# Patient Record
Sex: FEMALE | Race: WHITE
[De-identification: names, ages, dates, MRNs, and addresses within clinical notes are randomized per-mention and may not be internally consistent; named-entity substitution may affect disease eponyms.]

---

## 2017-03-06 NOTE — DEL
DATE OF DELIVERY:  2017

 

Admission And Delivery Note

 

DATE OF DELIVERY:  2017.

 

REASON FOR ADMISSION:  This  31-year-old  female,  2,

para 0-0-1-0 female with an EDC of 2017, was admitted in active labor.

The patient's pregnancy had been uncomplicated with no proteinuria, glucosuria,

or hypertension.  She had a positive group B strep screen however.  Also, she

refused pelvic exam and/or Pap smears because of vaginismus.  She had a previous

history of Donna-Parkinson-White arrhythmias, but had no difficulties during her

pregnancy.  She has had no previous syncope, has had no chest pain.  She did

become somewhat lightheaded once when eyedrops for dilatation replaced during on

exam, but no syncope at that time, and she has had no other difficulties.  She

said that about 0500 hours in the morning prior to admission, she was awake and

with her early contractions that were mild and infrequent, however as the day

went on, she has progressed in intensity and frequency and by late evening, she

was having contractions every 2-3 minutes.  She contacted her  who was in

town in the [facility] for guard duty and he came back.  They then presented here

for admission.  She said there has been no loss of fluid.  She has had no

vaginal bleeding.  The baby has been active.  She denies any headaches or

blurred vision and has had no chest pain or palpitations.

 

CURRENT MEDICATIONS:  None other than prenatal vitamins and she takes a calcium

supplement along with an iron tablet and vitamin D.

 

ALLERGIES:  None that are known.

 

SOCIAL HISTORY:  She does not smoke nor use alcohol.

 

PAST MEDICAL HISTORY:  Unremarkable except for previous tooth extraction surgery

and she has had previous lumbar disk herniation that did not require surgery.

She has had one previous miscarriage.

 

FAMILY HISTORY:  Unremarkable.

 

REVIEW OF SYSTEMS:  Full review of systems was discussed, other than that

mentioned above in the HPI was unremarkable.

 

PHYSICAL EXAMINATION:  VITAL SIGNS:  At this time showed her to be afebrile.

Blood pressure 118/80, pulse is 90 and regular, respirations were 18, and O2

saturation is 99% on room air.  GENERAL:  Well-developed, somewhat obese female,

who is quite anxious, but appeared in no acute distress.  HEENT:  Unremarkable

except for glasses.  NECK:  Supple.  There is no jugular venous distention.

CHEST:  Clear.  CARDIOVASCULAR:  Revealed a normal S1 and S2 without murmur,

rub, or gallop.  No significant tachycardia was noted.  ABDOMEN:  Obese, soft

with a gravid uterus measuring 40 cm.  Fetal heart tones were present in the

left lower quadrant and in 130 to 140 range.  She refused pelvic exam.  Multiple

nurses attempted with just well lubricated one finger and she would scream,

uncontrollably.  EXTREMITIES:  Showed just a trace edema about the ankles,

otherwise was unremarkable.  NEUROLOGIC:  She was completely intact.

 

Because we were unable to evaluate the pelvis, we spent some time talking to the

patient to get exam done at some kind.  Fetal monitor was placed and the baby

looks good.  Her contractions were every 3-4 minutes and she said they were

becoming very uncomfortable.  We were unable to evaluate cervix and had no idea

how far along she had progressed, because she had been in labor for that length

of time.  We were very concerned.  She then advised everyone that, she was told

that when she came into labor, then she could have an epidural and then a pelvic

exam could be done.  I did discuss with her the fact that if in fact, the

patient is not in good labor that the epidural could slow this down or stop it,

but she refused to be evaluated in any other way.  After risks and benefits of

epidural anesthesia were discussed with her in detail, she requested we proceed.

Anesthesia was called, epidural was placed, and after appropriate pain relief

was obtained, I was able to do a pelvic exam using well lubricated one finger.

The cervix was high, approximately 70% effaced with just a dimple for

dilatation, but was quite soft, still quite posterior.  However, the patient did

continue to have contractions every 3-4 minutes and although, she was no longer

feeling and they appeared to be a fairly good quality.  Therefore, we elected to

admit her and continued to observe.  The epidural was continued and throughout

the remainder of the night and the following morning, she continued her

contractions, she remained quite comfortable.  Initially, she did develop a

little bit of hypotension, but we aggressively hydrated her with IV fluids and

that seemed to improve.  By morning, however hypotension continued with some

fetal heart rate variation and I elected to decrease her epidural infusion rate.

We continued aggressive hydration and with that, her blood pressure improved.

Fetal heart tones improved nicely and by midmorning, she had progressed to 2 cm

dilatation.  We allowed her to continue to labor with membranes intact and by

the late afternoon, she progressed to 5 cm dilatation.  The head was down to

approximately -1 station and her contractions were still about every 3-5 minutes

apart.  She was quite very comfortable.  We elected to go ahead and rupture

membranes as the head appeared to be floating a bit and a small rent in the

amniotic sac was placed with my fingers in place without fluid to be released

and the head came right down on to the cervix with no evidence of nuchal cord.

We continued to monitor then throughout the evening and by about 2200 hours, she

went on to complete her dilatation.  We did have to increase her epidural

infusion rate because of increasing discomfort and she again refused to allow

pelvic exam.  However, once the rate was increased, she was more comfortable and

this was accomplished.  She then began pushing and when I arrived at

approximately 2230 hours, she was at +2 station, vertex LAZARUS presentation.  She

continued to push and then began having some vaginal bleeding.  This was

controlled most of the time which was pressure.  We allowed her to continue to

push and for the next 2 hours, she eventually was able to get the baby down to

about a +4 station, but was unable to get the head under the symphysis pubis.  I

felt she had plenty of room in the pelvis, but her pushes were very short and

proved to be fairly adequate at best.  After over 2 hours of pushing, I

discussed vacuum extraction with both the patient and her  and they

agreed that they would like to proceed as she was tiring out.  We had also seen

some significant deceleration during pushes that did respond with oxygen

therapy, but they were becoming more and more frequent.  Vacuum extractor was

placed and with her next push, suction was applied and we were able to advance

the head quite easily to just under the symphysis pubis.  Suction was released

and we waited for her next contraction and with the next contraction and her

pushing along with the help of the vacuum extractor, we were able to advance the

head under the symphysis pubis and get it to .  Prior to that, numbed

the midline of the perineal body with 1% Xylocaine, but before I was able to

make episiotomy, she pushed aggressively and the head was delivered.  The

nasopharynx was then suctioned free of amniotic fluid and blood.  The baby

rotated to GILSON presentation.  There was a nuchal cord x2.  It was not

excessively tight, but I was unable to stretch it over the head.  The clamps

were placed and the cord was cut and the cord was unwrapped.  The baby was then

rotated to an LOT presentation and the right anterior followed by the left

posterior shoulder were delivered without difficulty.  The nasopharynx was again

suctioned free of amniotic fluid and blood.  Cord clamp was placed much more

proximal to the umbilicus and the baby was handed off to the nurse in

attendance.  Apgar score was 9 and 9.  Birth weight was 7 pounds 2 ounces.  It

was a female infant.  Attention was returned to the mother and cord blood was

obtained and handed off.  The placenta was delivered after 3-4 minutes of

uterine massage.  It appeared to be in a Adkins presentation and intact.  A 10

units of Pitocin was placed in the full IV bag and it run at 999 mL/h.  With the

uterine massage, hemostasis was obtained.  The patient had however persistent

significant vaginal bleeding.  She was very touchy when we came to vaginal exam

despite the fact that we had the epidural still running.  Visualization was

difficult and she spent a fair amount of time moving.  I re-anesthetized the

perineal body and inspected the vaginal area.  The patient had 2 vaginal tears.

One it was somewhat superficial along the left vaginal gutter.  It was

approximately 4 cm in length, but was not actively bleeding.  Another deeper

laceration extended from the perineal body proximally up to about 6-7 cm in the

midline of the vagina that had torn through the mucosa.  With the help of the

nurse, a Gelpi retractors and some good visualization, I was able to identify

the apex of the tear and a 2-0 chromic suture was placed.  Then, using a running

2-0 chromic, I was able to approximate the tear to the level of the perineal

body.  This controlled bleeding very well.  The perineal body was then rebuilt

using interrupted chromic sutures.  The anterior capsule of the sphincter muscle

had been torn somewhat and this was repaired with a single 2-0 chromic suture.

The vaginal mucosa was then closed with a running 2-0 chromic suture as was the

squamous mucosa over the perineal body.  The patient actually tolerated the

whole procedure fairly well once we were able to get started.  Estimated blood

loss, however, was significant at approximately 300 to 400 mL as the tear had

extended up to the vaginal vault and it was a partial 3rd degree.  The vagina

was then reinspected and all packing was removed.  The rectum was checked and

remained patent and her bleeding had decreased to very minimal thereafter.  All

instruments were accounted for.  The patient remained in the delivery room

awake, alert, and with her baby with stable vital signs.  The epidural was

discontinued and her IV was slowed and will be changed to saline lock when

stable.  We will proceed from there.

 

Job#: [id number]

DD: 2017 1255

DT: 2017 1531 /BONIFACIOL

## 2017-03-07 NOTE — PCM.PNPP
- General Info


Date of Service: 17


Functional Status: Reports: pain controlled





- Review of Systems


General: Reports: no symptoms


HEENT: Reports: no symptoms


Pulmonary: Reports: no symptoms


Cardiovascular: Reports: no symptoms


Gastrointestinal: Reports: No symptoms


Genitourinary: Reports: no symptoms


Musculoskeletal: Reports: no symptoms


Skin: Reports: no symptoms


Neurological: Reports: no symptoms


Psychiatric: Reports: no symptoms





- General Info


Date of Service: 17





- Patient Data


Vital Signs - most recent: 


 Last Vital Signs











Temp  98 F   17 15:45


 


Pulse  83   17 15:45


 


Resp  18   17 15:45


 


BP  116/77   17 15:45


 


Pulse Ox  98   17 15:45











Weight - most recent: 92.533 kg


Lab Results - last 24 hrs: 


 Laboratory Results - last 24 hr











  17 Range/Units





  06:35 


 


WBC  15.5 H  (4.5-12.0)  X10-3/uL


 


RBC  2.95 L  (3.23-5.20)  x10(6)uL


 


Hgb  8.6 L  (11.5-15.5)  g/dL


 


Hct  26.4 L  (30.0-51.3)  %


 


MCV  89.5  (80-96)  fL


 


MCH  29.2  (27.7-33.6)  pg


 


MCHC  32.7  (32.2-35.4)  g/dL


 


RDW  13.4  (11.5-15.5)  %


 


Plt Count  172  (125-369)  X10(3)uL


 


MPV  7.4  (7.4-10.4)  fL


 


Neut % (Auto)  69.0  (46-82)  %


 


Lymph % (Auto)  21.7  (13-37)  %


 


Mono % (Auto)  7.9  (4-12)  %


 


Eos % (Auto)  1  (1.0-5.0)  %


 


Baso % (Auto)  0  (0-2)  %


 


Neut #  10.7 H  (1.6-8.3)  #


 


Lymph #  3.4  (0.6-5.0)  #


 


Mono #  1.2  (0.0-1.3)  #


 


Eos #  0.2  (0.0-0.8)  #


 


Baso #  0.0  (0.0-0.2)  #











Med Orders - Current: 


 Current Medications





Acetaminophen/Hydrocodone Bitart (Norco 325-5 Mg)  1 - 2 tab PO Q4H PRN


   PRN Reason: Pain (severe 7-10)


   Last Admin: 17 17:11 Dose:  1 tab


Ibuprofen (Motrin)  800 mg PO Q8H PRN


   PRN Reason: Pain


   Last Admin: 17 15:00 Dose:  800 mg


Prenatal Multivit/Folic Acid/Iron (Prenatal-U)  1 each PO DAILY@1200 LATOSHA


   Last Admin: 17 11:54 Dose:  1 each





Discontinued Medications





Ampicillin Sodium (Ampicillin) Confirm Administered Dose 2 gm IV .STK-MED ONE


   Stop: 17 00:30


   Last Admin: 17 00:48 Dose:  2 gm


Ampicillin Sodium (Ampicillin) Confirm Administered Dose 1 gm IV .STK-MED ONE


   Stop: 17 03:37


   Last Admin: 17 03:58 Dose:  Not Given


Ampicillin Sodium (Ampicillin) Confirm Administered Dose 1 gm IV .STK-MED ONE


   Stop: 17 07:54


   Last Admin: 17 07:56 Dose:  Not Given


Diphenhydramine HCl (Benadryl)  25 mg IVPUSH ASDIRECTED PRN


   PRN Reason: PRURITUS


Ephedrine Sulfate (Ephedrine Sulfate)  5 mg IVPUSH ASDIRECTED PRN


   PRN Reason: HYPOTENSION


   Last Admin: 17 06:33 Dose:  5 mg


Ephedrine Sulfate (Ephedrine Sulfate) Confirm Administered Dose 50 mg .ROUTE 

.STK-MED ONE


   Stop: 17 05:57


   Last Admin: 17 07:56 Dose:  Not Given


Hydroxyzine HCl (Vistaril)  25 - 50 mg IM Q4H PRN


   PRN Reason: N/V


Ampicillin Sodium 2 gm/ Sodium (Chloride)  100 mls @ 200 mls/hr IV ONETIME ONE


   Stop: 17 00:29


   Last Admin: 17 00:40 Dose:  200 mls/hr


Ampicillin Sodium 1 gm/ Sodium (Chloride)  50 mls @ 100 mls/hr IV Q4H LATOSHA


   Last Admin: 17 23:50 Dose:  100 mls/hr


Lactated Ringer's (Ringers, Lactated)  1,000 mls @ 150 mls/hr IV ASDIRECTED LATOSHA


   Last Admin: 17 05:21 Dose:  150 mls/hr


Naloxone HCl 0.4 mg/ Sodium (Chloride)  101 mls @ 25 mls/hr IV ASDIRECTED PRN


   PRN Reason: PER ORDER OF ANESTHESIA


Lidocaine HCl (Xylocaine 1%)  20 ml INJECT ONETIME ONE


   Stop: 17 00:06


   Last Admin: 17 00:15 Dose:  20 ml


Lidocaine HCl (Xylocaine 1%)  20 ml INJECT ONETIME ONE


   Stop: 17 00:41


Naloxone HCl (Narcan)  0.1 mg IVPUSH ASDIRECTED PRN


   PRN Reason: RESPIRATORY STATUS


Ondansetron HCl (Zofran)  4 mg IVPUSH Q6H PRN


   PRN Reason: N/V


Oxytocin (Pitocin)  10 unit IV ONETIME ONE


   Stop: 17 00:33


   Last Admin: 17 00:32 Dose:  10 unit


Promethazine HCl (Phenergan)  6.25 - 12.5 mg IV Q4H PRN


   PRN Reason: NAUSEA AND VOMITING


Sodium Chloride (Saline Flush)  10 ml FLUSH ASDIRECTED PRN


   PRN Reason: Keep Vein Open


   Last Admin: 17 06:00 Dose:  10 ml











- Infant Interaction


Infant Disposition, Postpartum: [place] in Room with Family


Support Person: 





- Postpartum Recovery Exam


Fundal Tone: Firm


Fundal Level: At Umbilicus


Fundal Placement: Midline


Lochia Amount: Small, Moderate


Lochia Color: Rubra/Red


Perineum Description: Edematous


Episiotomy/Laceration: Approximated


Bladder Status: Voiding


Urinary Elimination: Voided





- Exam


General: alert, oriented


HEENT: Pupils equal


Neck: supple


Lungs: Clear to auscultation, Normal respiratory effort


Cardiovascular: regular rate, regular rhythm


Abdomen: bowel sounds present, soft, no tenderness, no distension


Extremities: no edema


Skin: warm, dry, intact


Wound/Incisions: healing well


Neurological: no new focal deficit


Psy/Mental Status: alert, normal affect, normal mood





- Problem List & Annotations


(1) Delivery normal


SNOMED Code(s): 18559329


   Code(s): O80 - ENCOUNTER FOR FULL-TERM UNCOMPLICATED DELIVERY; Z37.9 - 

OUTCOME OF DELIVERY, UNSPECIFIED   Status: Acute   Current Visit: Yes   





(2) WPW (Donna-Parkinson-White syndrome)


SNOMED Code(s): 04123422


   Code(s): I45.6 - PRE-EXCITATION SYNDROME   Status: Acute   Current Visit: 

Yes   





- Problem List Review


Problem List Initiated/Reviewed/Updated: Yes





- Plan


Plan:: 





Routine cares

## 2017-03-08 NOTE — PN
DATE SEEN:  03/08/2017

 

CHIEF COMPLAINT:  Mild abdominal discomfort.

 

HISTORY OF PRESENT ILLNESS:  This is a 31-year-old female, who delivered 2 days

ago, is second day postpartum.  She has some mild discomfort and cramps,

controlled by Motrin.  She denies any fever, chills, or chest pain.  No

headache.  No shortness of breath.

 

PAST MEDICAL HISTORY:  Donna-Parkinson-White syndrome.

 

MEDICATIONS:  Reviewed.

 

ALLERGIES:  Reviewed.

 

PHYSICAL EXAMINATION:  GENERAL:  Pleasant.  VITAL SIGNS:  Blood pressure is

normal.  Temperature 97.8.  ABDOMEN:  Soft and benign.  MENTAL STATUS:  Alert.

NEUROLOGIC:  No focal findings.

 

LABORATORY:  Yesterday's hemoglobin is 8.6.

 

FINAL IMPRESSION:

1. Second day postpartum, term vaginal delivery.

2. Donna-Parkinson-White syndrome, stable.

3. Anemia due to blood loss.

 

PLAN:  We will discharge home today.  Continue iron tablets, prenatal vitamins,

and return at 6 weeks.

 

Job#: [id number]

DD: 03/08/2017 0933

DT: 03/08/2017 1025 TN/THOMAS

## 2017-03-09 NOTE — DISCH
DISCHARGE DATE:  03/08/2017

 

REASON FOR ADMISSION:  Term pregnancy in labor.

 

DISCHARGE DIAGNOSES:

1. Spontaneous vaginal delivery, vacuum assisted.

2. Anemia.

3. Donna-Parkinson-White.

 

BRIEF HISTORY AND HOSPITAL COURSE:  This is a 31-year-old female, admitted for a

regular contractions, delivered by Dr. Mercer using a vacuum assisted delivery.

Hemoglobin was down to 8.6.  The patient was asymptomatic, has also had Donna-

Parkinson-White before, but no symptoms.  Discharged home today on the 8th, on

iron tablets and prenatal vitamins.

 

FOLLOW-UP:  She will follow up in the office in 6 weeks.

 

Please note, that I spent 35 minutes in discharge of the patient.

 

Job#: [id number]

DD: 03/08/2017 0934

DT: 03/09/2017 0413 TN/THOMAS

## 2020-06-11 ENCOUNTER — HOSPITAL ENCOUNTER (EMERGENCY)
Dept: HOSPITAL 7 - FB.ED | Age: 35
Discharge: HOME | End: 2020-06-11
Payer: COMMERCIAL

## 2020-06-11 VITALS — HEART RATE: 87 BPM | DIASTOLIC BLOOD PRESSURE: 70 MMHG | SYSTOLIC BLOOD PRESSURE: 121 MMHG

## 2020-06-11 DIAGNOSIS — R07.89: ICD-10-CM

## 2020-06-11 DIAGNOSIS — E66.9: ICD-10-CM

## 2020-06-11 DIAGNOSIS — R53.83: ICD-10-CM

## 2020-06-11 DIAGNOSIS — I45.6: Primary | ICD-10-CM

## 2020-06-11 DIAGNOSIS — Z79.899: ICD-10-CM

## 2020-06-11 NOTE — EDM.PDOC
ED HPI GENERAL MEDICAL PROBLEM





- General


Stated Complaint: CHEST ACHE


Time Seen by Provider: 20 16:05


Source of Information: Reports: Patient


History Limitations: Reports: No Limitations





- History of Present Illness


INITIAL COMMENTS - FREE TEXT/NARRATIVE: 





34-year-old female who reports development of nasal congestion and fatigue and 

the feeling that she had a viral type illness in the beginning of May 2020. She 

reports that on May 6 she began to have pains in her central chest that a 

pressure and aching type pain that seemed to come on with activity and get 

better with rest. The pain seemed to come and go. She felt somewhat short of 

breath associated with them. She has continued to have these intermittent chest 

pains throughout and on 2020 she had a COVID 19 tests performed at the 

[facility] in [place] and it was negative. The symptoms continued and on 6/

3/2020 because of the symptoms and the symptoms of fatigue and malaise that 

were not going away, she was seen again at [facility] by Dr. Alejo and 

had an EKG which showed nothing acute, a chest x-ray which was normal and lab 

tests which were normal including a negative d-dimer. She reports the beginning 

2 days ago, the pain in her chest which is an aching pain seemed to become 

constant and has been present continuously for the past 2 days and she would 

rate the pain as a 3/10. It is different in that it is not exacerbated by 

exercise. It does seem to be somewhat worse when she lies down. Another 

predominant symptom is that she just feels very fatigued and has no exercise 

tolerance at all. She denies any shortness of breath. She has had no cough. 

There has been no nausea or vomiting. There has been no fevers or chills. She 

has no nasal congestion or sore throat. She has been eating and drinking 

normally. No feeling of racing heart rate or slow heart rate. No syncope or 

presyncope. There are no other associated signs or symptoms. There are no other 

modifying factors.


Onset: Other (Ongoing since May 6, 2020 but for the past 2 days has been 

constant and seems to be worse/different.)


Duration: Constant, Getting Worse


Location: Reports: Chest


Quality: Reports: Ache, Pressure


Severity: Mild (3/10.)


Improves with: Reports: None


Worsens with: Reports: Other (Lying down)


Context: Reports: Other (As above)


Associated Symptoms: Reports: Chest Pain, Malaise, Other (Fatigue)


Treatments PTA: Reports: Other (see below) (Nothing)


  ** Midsternal chest


Pain Score (Numeric/FACES): 3





- Related Data


 Allergies











Allergy/AdvReac Type Severity Reaction Status Date / Time


 


No Known Allergies Allergy   Verified 20 15:57











Home Meds: 


 Home Meds





Multivit 45/Iron/Folate 6/Dha [Prenate Dha Softgel] 1 cap PO DAILY 01/15/17 [

History]


Cholecalciferol (Vitamin D3) [Vitamin D3] 1,000 unit PO BID 17 [History]


Ferrous Sulfate [Iron] 325 mg PO Q3D 17 [History]


Ca/D3/Mag Ox/Zinc//Delfino/Bor [Calcium 600+D3 Plus Caplet] 1 each PO BID  [History]











Past Medical History


HEENT History: Reports: Impaired Vision


Other HEENT History: pt wears glasses


Cardiovascular History: Reports: Arrhythmia (donna parkinson white syndrome)


Other OB/GYN History:  AB1


Musculoskeletal History: Reports: Back Pain, Chronic, Other (See Below)


Other Musculoskeletal History: hx of herniated disc


Psychiatric History: Reports: Anxiety, Depression


Endocrine/Metabolic History: Reports: Obesity/BMI 30+


Dermatologic History: Reports: Other (See Below)


Other Dermatologic History: very dry/cracked in winter





- Infectious Disease History


Infectious Disease History: Reports: Chicken Pox





- Past Surgical History


Other Surgical History Comment: No previous surgeries.





Social & Family History





- Tobacco Use


Smoking Status *Q: Unknown Ever Smoked (Nonsmoker)


Second Hand Smoke Exposure: No





- Caffeine Use


Caffeine Use: Reports: Coffee, Soda, Tea


Caffeine Use Comment: discussed importance to limit caffeine intake





- Alcohol Use


Alcohol Use History: Yes


Alcohol Use Frequency: Monthly (1-2 times a month.)





- Living Situation & Occupation


Living situation: Reports: 


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





ED ROS GENERAL





- Review of Systems


Review Of Systems: See Below


Constitutional: Reports: Malaise, Fatigue.  Denies: Fever, Chills, Diaphoresis, 

Decreased Appetite


HEENT: Reports: No Symptoms


Respiratory: Reports: No Symptoms


Cardiovascular: Reports: Chest Pain.  Denies: Edema, Lightheadedness, 

Palpitations


Endocrine: Reports: No Symptoms


GI/Abdominal: Reports: No Symptoms


: Reports: No Symptoms


Musculoskeletal: Reports: No Symptoms


Skin: Reports: No Symptoms


Neurological: Reports: No Symptoms


Psychiatric: Reports: No Symptoms


Hematologic/Lymphatic: Reports: No Symptoms


Immunologic: Reports: No Symptoms





ED EXAM, GENERAL





- Physical Exam


Exam: See Below


Exam Limited By: No Limitations


General Appearance: Alert, WD/WN, No Apparent Distress


Eye Exam: Bilateral Eye: EOMI, Normal Inspection, PERRL


Ears: Normal External Exam, Hearing Grossly Normal


Ear Exam: Bilateral Ear: Auricle Normal


Nose: Normal Inspection, Normal Mucosa, No Blood


Throat/Mouth: Normal Inspection, Normal Lips, Normal Oropharynx, Normal Voice, 

No Airway Compromise


Head: Atraumatic, Normocephalic


Neck: Normal Inspection, Supple, Non-Tender, Full Range of Motion


Respiratory/Chest: No Respiratory Distress, Lungs Clear, Normal Breath Sounds, 

No Accessory Muscle Use, Chest Non-Tender


Cardiovascular: Normal Peripheral Pulses, Regular Rate, Rhythm, No Edema, No JVD

, No Murmur


Peripheral Pulses: 2+: Radial (L), Radial (R)


GI/Abdominal: Normal Bowel Sounds, Soft, Non-Tender


Back Exam: Normal Inspection, Full Range of Motion


Extremities: Normal Inspection, Normal Range of Motion, Non-Tender, No Pedal 

Edema, Normal Capillary Refill


Neurological: Alert, Oriented, CN II-XII Intact, Normal Cognition, No Motor/

Sensory Deficits


Psychiatric: Normal Affect


Skin Exam: Warm, Dry, Intact, Normal Color, No Rash





EKG INTERPRETATION


EKG Date: 20


Time: 16:10


Rhythm: NSR


Rate (Beats/Min): 92


Axis: Normal


P-Wave: Present


QRS: Normal


ST-T: Other (Inverted T waves in V1 through V4.)


QT: Prolonged (Slightly prolonged QTc.)


Comparison: No Change (No change in an EKG that was performed on 10/25/2016. A 

copy of this EKG was sent to us from the [facility] in [place] and will be 

placed in the patient's record.)





Course





- Vital Signs


Last Recorded V/S: 


 Last Vital Signs











Temp  36.4 C   20 15:52


 


Pulse  87   20 17:33


 


Resp  18   20 17:33


 


BP  121/70   20 17:33


 


Pulse Ox  100   20 17:33














- Orders/Labs/Meds


Orders: 


 Active Orders 24 hr











 Category Date Time Status


 


 EKG Documentation Completion [RC] ASDIRECTED Care  20 16:49 Active


 


 Chest 2V [CR] Stat Exams  20 16:47 Taken


 


 EKG 12 Lead [EK] Routine Ther  20 16:49 Ordered











Labs: 


 Laboratory Tests











  20 Range/Units





  16:45 16:45 16:45 


 


WBC  6.3    (4.5-12.0)  X10-3/uL


 


RBC  4.52    (3.23-5.20)  x10(6)uL


 


Hgb  12.6    (11.5-15.5)  g/dL


 


Hct  39.6  D    (30.0-51.3)  %


 


MCV  87.6    (80-96)  fL


 


MCH  27.9    (27.7-33.6)  pg


 


MCHC  31.8 L    (32.2-35.4)  g/dL


 


RDW  12.6    (11.5-15.5)  %


 


Plt Count  287    (125-369)  X10(3)uL


 


MPV  6.9 L    (7.4-10.4)  fL


 


Neut % (Auto)  60.6    (46-82)  %


 


Lymph % (Auto)  29.7    (13-37)  %


 


Mono % (Auto)  8.0    (4-12)  %


 


Eos % (Auto)  1    (1.0-5.0)  %


 


Baso % (Auto)  1    (0-2)  %


 


Neut # (Auto)  3.8    (1.6-8.3)  #


 


Lymph # (Auto)  1.9    (0.6-5.0)  #


 


Mono # (Auto)  0.5    (0.0-1.3)  #


 


Eos # (Auto)  0.1    (0.0-0.8)  #


 


Baso # (Auto)  0.0    (0.0-0.2)  #


 


PT   11.3 H   (9.0-11.1)  sec


 


INR   1.05   (1.00-1.24)  


 


APTT   28.3   (24.4-33.2)  SECONDS


 


Sodium    140  (135-145)  mmol/L


 


Potassium    3.6  (3.5-5.3)  mmol/L


 


Chloride    103  (100-110)  mmol/L


 


Carbon Dioxide    28  (21-32)  mmol/L


 


BUN    7  (7-18)  mg/dL


 


Creatinine    0.6  (0.55-1.02)  mg/dL


 


Est Cr Clr Drug Dosing    114.08  mL/min


 


Estimated GFR (MDRD)    > 60  (>60)  


 


BUN/Creatinine Ratio    11.7  (9-20)  


 


Glucose    93  ()  mg/dL


 


Calcium    8.8  (8.6-10.2)  mg/dL


 


Magnesium     (1.8-2.5)  mg/dL


 


Total Bilirubin    0.3  (0.1-1.3)  mg/dL


 


AST    9  (5-25)  IU/L


 


ALT    15  (12-36)  U/L


 


Alkaline Phosphatase    61  ()  IU/L


 


Troponin I     (4.0-60.3)  pg/mL


 


NT-Pro-B Natriuret Pep     (<=125)  pg/mL


 


Total Protein    7.7  (6.0-8.0)  g/dL


 


Albumin    4.2  (3.5-5.2)  g/dL


 


Globulin    3.5  g/dL


 


Albumin/Globulin Ratio    1.2  


 


Urine HCG, Qual     (NEGATIVE)  














  20 Range/Units





  16:45 16:45 17:30 


 


WBC     (4.5-12.0)  X10-3/uL


 


RBC     (3.23-5.20)  x10(6)uL


 


Hgb     (11.5-15.5)  g/dL


 


Hct     (30.0-51.3)  %


 


MCV     (80-96)  fL


 


MCH     (27.7-33.6)  pg


 


MCHC     (32.2-35.4)  g/dL


 


RDW     (11.5-15.5)  %


 


Plt Count     (125-369)  X10(3)uL


 


MPV     (7.4-10.4)  fL


 


Neut % (Auto)     (46-82)  %


 


Lymph % (Auto)     (13-37)  %


 


Mono % (Auto)     (4-12)  %


 


Eos % (Auto)     (1.0-5.0)  %


 


Baso % (Auto)     (0-2)  %


 


Neut # (Auto)     (1.6-8.3)  #


 


Lymph # (Auto)     (0.6-5.0)  #


 


Mono # (Auto)     (0.0-1.3)  #


 


Eos # (Auto)     (0.0-0.8)  #


 


Baso # (Auto)     (0.0-0.2)  #


 


PT     (9.0-11.1)  sec


 


INR     (1.00-1.24)  


 


APTT     (24.4-33.2)  SECONDS


 


Sodium     (135-145)  mmol/L


 


Potassium     (3.5-5.3)  mmol/L


 


Chloride     (100-110)  mmol/L


 


Carbon Dioxide     (21-32)  mmol/L


 


BUN     (7-18)  mg/dL


 


Creatinine     (0.55-1.02)  mg/dL


 


Est Cr Clr Drug Dosing     mL/min


 


Estimated GFR (MDRD)     (>60)  


 


BUN/Creatinine Ratio     (9-20)  


 


Glucose     ()  mg/dL


 


Calcium     (8.6-10.2)  mg/dL


 


Magnesium   1.8   (1.8-2.5)  mg/dL


 


Total Bilirubin     (0.1-1.3)  mg/dL


 


AST     (5-25)  IU/L


 


ALT     (12-36)  U/L


 


Alkaline Phosphatase     ()  IU/L


 


Troponin I  5.4    (4.0-60.3)  pg/mL


 


NT-Pro-B Natriuret Pep  61    (<=125)  pg/mL


 


Total Protein     (6.0-8.0)  g/dL


 


Albumin     (3.5-5.2)  g/dL


 


Globulin     g/dL


 


Albumin/Globulin Ratio     


 


Urine HCG, Qual    Negative  (NEGATIVE)  














- Radiology Interpretation


Free Text/Narrative:: 





Chest x-ray PA and lateral shows no acute disease.





- Re-Assessments/Exams


Free Text/Narrative Re-Assessment/Exam: 





20 17:50: Patient's blood tests are all reassuringly normal. Her chest x-

ray showed no acute pathology. Her EKG was unchanged from an EKG performed in 

2016. A Holter monitor patient had come back and it showed no significant 

problem. I am unsure why she is having the fatigue and the chest discomfort. 

She will need to follow-up with her primary provider as she may need additional 

outpatient testing or even referral to cardiology for a recheck. I have advised 

her to avoid any strenuous activity until she can get the to do so through her 

primary provider. Precautions and reasons for return to the emergency 

department were discussed with the patient while she was in the emergency 

department and were detailed in her discharge instructions.








Departure





- Departure


Time of Disposition: 18:58


Disposition: Home, Self-Care 01


Condition: Good (Stable)


Clinical Impression: 


 Atypical chest pain, WPW (Donna-Parkinson-White syndrome)





Fatigue


Qualifiers:


 Fatigue type: unspecified Qualified Code(s): R53.83 - Other fatigue








- Discharge Information


Instructions:  Fatigue, Nonspecific Chest Pain, Adult, Easy-to-Read


Referrals: 


Gato Alejo MD [Primary Care Provider] - 


Additional Instructions: 


Your blood tests were all reassuringly normal. Your EKG was unchanged from an 

EKG that was performed and 2016. Your chest x-ray showed no acute 

problem. Your pregnancy test was negative. I do not have an etiology for your 

chest discomfort and your fatigue. Does not appear to be anything of a serious 

nature at this point. You should, however, avoid any strenuous activity until 

you are cleared by your primary provider. You should plan on following up with 

your primary provider as you may need additional outpatient testing and even 

possibly referral to the cardiologist again for recheck. Back to the emergency 

department for marked increase in pain, difficulty breathing at rest, coughing 

of blood, swelling in your legs or any other concerning sign or symptom.





Sepsis Event Note (ED)





- Focused Exam


Vital Signs: 


 Vital Signs











  Temp Pulse Resp BP BP Pulse Ox


 


 20 17:33   87  18  121/70  119/82  100


 


 20 16:57   83  18   108/57 L  100


 


 20 15:52  36.4 C  91  18  137/109 H   100














- My Orders


Last 24 Hours: 


My Active Orders





20 16:47


Chest 2V [CR] Stat 





20 16:49


EKG Documentation Completion [RC] ASDIRECTED 


EKG 12 Lead [EK] Routine 














- Assessment/Plan


Last 24 Hours: 


My Active Orders





20 16:47


Chest 2V [CR] Stat 





20 16:49


EKG Documentation Completion [RC] ASDIRECTED 


EKG 12 Lead [EK] Routine

## 2020-06-11 NOTE — CR
INDICATION:  Central and slightly left chest pain. 



CHEST, TWO VIEWS:  PA and lateral views of the chest reveal the heart and 
mediastinum and bony thorax to be unremarkable.  



An active infiltrate or effusion was not identified. 



IMPRESSION:  No active disease.  
MTDD

## 2020-09-23 ENCOUNTER — HOSPITAL ENCOUNTER (EMERGENCY)
Dept: HOSPITAL 7 - FB.ED | Age: 35
Discharge: HOME | End: 2020-09-23
Payer: COMMERCIAL

## 2020-09-23 VITALS — SYSTOLIC BLOOD PRESSURE: 118 MMHG | HEART RATE: 85 BPM | DIASTOLIC BLOOD PRESSURE: 76 MMHG

## 2020-09-23 DIAGNOSIS — R07.9: Primary | ICD-10-CM

## 2020-09-23 DIAGNOSIS — E66.9: ICD-10-CM

## 2020-09-23 NOTE — EDM.PDOC
ED HPI GENERAL MEDICAL PROBLEM





- General


Chief Complaint: Syncope


Stated Complaint: FELT LIKE WAS GOING TO PASS OUT


Time Seen by Provider: 20 11:00


Source of Information: Reports: Patient


History Limitations: Reports: No Limitations





- History of Present Illness


INITIAL COMMENTS - FREE TEXT/NARRATIVE: 





Patient presented to the ED because of seeing stars while driving. She just went

to have a stress test from the [facility] due tpo chest pain. She was chest 

pain free upon her arrival in the ED,denies any N/V, dyspnea.  


  ** chest


Pain Score (Numeric/FACES): 3





- Related Data


                                    Allergies











Allergy/AdvReac Type Severity Reaction Status Date / Time


 


No Known Allergies Allergy   Verified 20 15:57











Home Meds: 


                                    Home Meds





Multivit 45/Iron/Folate 6/Dha [Prenate Dha Softgel] 1 cap PO DAILY 01/15/17 

[History]


Cholecalciferol (Vitamin D3) [Vitamin D3] 1,000 unit PO BID 17 [History]


Ferrous Sulfate [Iron] 325 mg PO Q3D 17 [History]


Ca/D3/Mag Ox/Zinc//Delfino/Bor [Calcium 600+D3 Plus Caplet] 1 each PO BID 

20 [History]











Past Medical History


HEENT History: Reports: Impaired Vision


Other HEENT History: pt wears glasses


Cardiovascular History: Reports: Arrhythmia, Other (See Below)


Other Cardiovascular History: Hernandez-Parkinson white Syndrome


OB/GYN History: Reports: Pregnancy


Other OB/GYN History:  AB1


Musculoskeletal History: Reports: Back Pain, Chronic, Other (See Below)


Other Musculoskeletal History: hx of herniated disc


Psychiatric History: Reports: Anxiety, Depression


Other Psychiatric History: seasonal affective disorder


Endocrine/Metabolic History: Reports: Obesity/BMI 30+


Dermatologic History: Reports: Other (See Below)


Other Dermatologic History: very dry/cracked in winter





- Infectious Disease History


Infectious Disease History: Reports: Chicken Pox





- Past Surgical History


Other Surgical History Comment: No previous surgeries.





Social & Family History





- Family History


Family Medical History: Unobtainable





- Tobacco Use


Smoking Status *Q: Never Smoker





- Caffeine Use


Caffeine Use: Reports: Coffee


Caffeine Use Comment: discussed importance to limit caffeine intake





- Recreational Drug Use


Recreational Drug Use: No





- Living Situation & Occupation


Living situation: Reports: 


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





ED ROS GENERAL





- Review of Systems


Review Of Systems: See Below


Constitutional: Reports: No Symptoms


HEENT: Reports: No Symptoms


Respiratory: Reports: No Symptoms


Cardiovascular: Reports: No Symptoms


Endocrine: Reports: No Symptoms


GI/Abdominal: Reports: No Symptoms


: Reports: No Symptoms


Musculoskeletal: Reports: No Symptoms


Neurological: Reports: No Symptoms


Psychiatric: Reports: No Symptoms





ED EXAM, GENERAL





- Physical Exam


Exam: See Below


Exam Limited By: No Limitations


General Appearance: Alert, No Apparent Distress


Eye Exam: Bilateral Eye: PERRL


Ears: Normal External Exam, Normal Canal


Nose: Normal Inspection, Normal Mucosa


Throat/Mouth: Normal Inspection, Normal Lips, Normal Teeth, Normal Gums


Head: Atraumatic, Normocephalic


Neck: Normal Inspection, Supple, Non-Tender, Full Range of Motion


Respiratory/Chest: No Respiratory Distress, Lungs Clear, Normal Breath Sounds, 

No Accessory Muscle Use, Chest Non-Tender


Cardiovascular: Normal Peripheral Pulses, Regular Rate, Rhythm, No Edema, No 

JVD, No Murmur


GI/Abdominal: Normal Bowel Sounds, Soft, Non-Tender, No Organomegaly


Back Exam: Normal Inspection, Full Range of Motion


Extremities: Normal Inspection, Normal Range of Motion, Non-Tender





Course





- Vital Signs


Text/Narrative:: 


Labs/EKG was discussed with patient 


Last Recorded V/S: 


                                Last Vital Signs











Temp  36.8 C   20 12:44


 


Pulse  85   20 12:44


 


Resp  16   20 12:44


 


BP  118/76   20 12:44


 


Pulse Ox  100   20 12:44














- Orders/Labs/Meds


Orders: 


                               Active Orders 24 hr











 Category Date Time Status


 


 EKG 12 Lead [EK] Routine Ther  20 12:57 Ordered











Labs: 


                                Laboratory Tests











  20 Range/Units





  12:07 12:07 12:07 


 


WBC  7.0    (4.5-12.0)  X10-3/uL


 


RBC  3.98    (3.23-5.20)  x10(6)uL


 


Hgb  11.3 L    (11.5-15.5)  g/dL


 


Hct  34.4    (30.0-51.3)  %


 


MCV  86.5    (80-96)  fL


 


MCH  28.3    (27.7-33.6)  pg


 


MCHC  32.8    (32.2-35.4)  g/dL


 


RDW  12.8    (11.5-15.5)  %


 


Plt Count  222    (125-369)  X10(3)uL


 


MPV  7.3 L    (7.4-10.4)  fL


 


Neut % (Auto)  76.1    (46-82)  %


 


Lymph % (Auto)  15.4    (13-37)  %


 


Mono % (Auto)  7.7    (4-12)  %


 


Eos % (Auto)  0 L    (1.0-5.0)  %


 


Baso % (Auto)  1    (0-2)  %


 


Neut # (Auto)  5.3    (1.6-8.3)  #


 


Lymph # (Auto)  1.1    (0.6-5.0)  #


 


Mono # (Auto)  0.5    (0.0-1.3)  #


 


Eos # (Auto)  0.0    (0.0-0.8)  #


 


Baso # (Auto)  0.0    (0.0-0.2)  #


 


Sodium   138   (135-145)  mmol/L


 


Potassium   4.0   (3.5-5.3)  mmol/L


 


Chloride   102   (100-110)  mmol/L


 


Carbon Dioxide   25   (21-32)  mmol/L


 


BUN   14   (7-18)  mg/dL


 


Creatinine   0.5 L   (0.55-1.02)  mg/dL


 


Est Cr Clr Drug Dosing   135.61   mL/min


 


Estimated GFR (MDRD)   > 60   (>60)  


 


BUN/Creatinine Ratio   28.0 H   (9-20)  


 


Glucose   87   ()  mg/dL


 


Calcium   8.5 L   (8.6-10.2)  mg/dL


 


Troponin I    4.9  (4.0-60.3)  pg/mL














Departure





- Departure


Time of Disposition: 13:15


Disposition: Home, Self-Care 01


Condition: Good


Clinical Impression: 


 Chest pain





Instructions:  Nonspecific Chest Pain, Adult, Easy-to-Read


Referrals: 


Gato Alejo MD [Primary Care Provider] - 


Forms:  ED Department Discharge


Additional Instructions: 


Please read discharge instructions on atypical chest pain


Follow up the result of your stress test








Sepsis Event Note (ED)





- Evaluation


Sepsis Screening Result: No Definite Risk





- Focused Exam


Vital Signs: 


                                   Vital Signs











  Temp Pulse Resp BP Pulse Ox


 


 20 12:44  36.8 C  85  16  118/76  100


 


 09/23/20 11:00  36.9 C  83  18  104/72  100














- My Orders


Last 24 Hours: 


My Active Orders





20 12:57


EKG 12 Lead [EK] Routine 














- Assessment/Plan


Last 24 Hours: 


My Active Orders





20 12:57


EKG 12 Lead [EK] Routine